# Patient Record
Sex: MALE | Race: WHITE
[De-identification: names, ages, dates, MRNs, and addresses within clinical notes are randomized per-mention and may not be internally consistent; named-entity substitution may affect disease eponyms.]

---

## 2023-05-30 ENCOUNTER — HOSPITAL ENCOUNTER (EMERGENCY)
Dept: HOSPITAL 75 - ER FS | Age: 18
Discharge: HOME | End: 2023-05-30
Payer: SELF-PAY

## 2023-05-30 VITALS — DIASTOLIC BLOOD PRESSURE: 62 MMHG | SYSTOLIC BLOOD PRESSURE: 105 MMHG

## 2023-05-30 DIAGNOSIS — Z28.310: ICD-10-CM

## 2023-05-30 DIAGNOSIS — N20.0: Primary | ICD-10-CM

## 2023-05-30 LAB
ALBUMIN SERPL-MCNC: 4.8 GM/DL (ref 3.2–4.5)
ALP SERPL-CCNC: 106 U/L (ref 40–136)
ALT SERPL-CCNC: 14 U/L (ref 0–55)
AMORPH SED URNS QL MICRO: (no result) /LPF
APTT PPP: YELLOW S
BACTERIA #/AREA URNS HPF: NEGATIVE /HPF
BASOPHILS # BLD AUTO: 0.1 10^3/UL (ref 0–0.1)
BASOPHILS NFR BLD AUTO: 1 % (ref 0–10)
BILIRUB SERPL-MCNC: 0.6 MG/DL (ref 0.1–1)
BILIRUB UR QL STRIP: NEGATIVE
BUN/CREAT SERPL: 19
CALCIUM SERPL-MCNC: 9.3 MG/DL (ref 8.5–10.1)
CHLORIDE SERPL-SCNC: 103 MMOL/L (ref 98–107)
CO2 SERPL-SCNC: 27 MMOL/L (ref 21–32)
CREAT SERPL-MCNC: 0.91 MG/DL (ref 0.6–1.3)
EOSINOPHIL # BLD AUTO: 0.2 10^3/UL (ref 0–0.3)
EOSINOPHIL NFR BLD AUTO: 4 % (ref 0–10)
FIBRINOGEN PPP-MCNC: (no result) MG/DL
GFR SERPLBLD BASED ON 1.73 SQ M-ARVRAT: 125 ML/MIN
GLUCOSE SERPL-MCNC: 88 MG/DL (ref 70–105)
GLUCOSE UR STRIP-MCNC: NEGATIVE MG/DL
HCT VFR BLD CALC: 45 % (ref 40–54)
HGB BLD-MCNC: 15.6 G/DL (ref 13.3–17.7)
KETONES UR QL STRIP: NEGATIVE
LEUKOCYTE ESTERASE UR QL STRIP: NEGATIVE
LIPASE SERPL-CCNC: 16 U/L (ref 8–78)
LYMPHOCYTES # BLD AUTO: 1.8 10^3/UL (ref 1–4)
LYMPHOCYTES NFR BLD AUTO: 28 % (ref 12–44)
MANUAL DIFFERENTIAL PERFORMED BLD QL: NO
MCH RBC QN AUTO: 30 PG (ref 25–34)
MCHC RBC AUTO-ENTMCNC: 35 G/DL (ref 32–36)
MCV RBC AUTO: 87 FL (ref 80–99)
MONOCYTES # BLD AUTO: 0.5 10^3/UL (ref 0–1)
MONOCYTES NFR BLD AUTO: 9 % (ref 0–12)
NEUTROPHILS # BLD AUTO: 3.6 10^3/UL (ref 1.8–7.8)
NEUTROPHILS NFR BLD AUTO: 58 % (ref 42–75)
NITRITE UR QL STRIP: NEGATIVE
PH UR STRIP: 7 [PH] (ref 5–9)
PLATELET # BLD: 167 10^3/UL (ref 130–400)
PMV BLD AUTO: 8.7 FL (ref 9–12.2)
POTASSIUM SERPL-SCNC: 4.3 MMOL/L (ref 3.6–5)
PROT SERPL-MCNC: 7.2 GM/DL (ref 6.4–8.2)
PROT UR QL STRIP: NEGATIVE
RBC #/AREA URNS HPF: (no result) /HPF
SODIUM SERPL-SCNC: 140 MMOL/L (ref 135–145)
SP GR UR STRIP: 1.02 (ref 1.02–1.02)
SQUAMOUS #/AREA URNS HPF: (no result) /HPF
WBC # BLD AUTO: 6.2 10^3/UL (ref 4.3–11)
WBC #/AREA URNS HPF: (no result) /HPF

## 2023-05-30 PROCEDURE — 36415 COLL VENOUS BLD VENIPUNCTURE: CPT

## 2023-05-30 PROCEDURE — 81000 URINALYSIS NONAUTO W/SCOPE: CPT

## 2023-05-30 PROCEDURE — 85025 COMPLETE CBC W/AUTO DIFF WBC: CPT

## 2023-05-30 PROCEDURE — 74176 CT ABD & PELVIS W/O CONTRAST: CPT

## 2023-05-30 PROCEDURE — 80053 COMPREHEN METABOLIC PANEL: CPT

## 2023-05-30 PROCEDURE — 83690 ASSAY OF LIPASE: CPT

## 2023-05-30 NOTE — ED ABDOMINAL PAIN
General


Chief Complaint:  Abdominal/GI Problems


Stated Complaint:  LLQ PAIN; VOMITING


Source of Information:  Patient, RN/MD (APRN April called from [facility] 

clinic to say she was sending the patient with his Left flank pain)





History of Present Illness


Date Seen by Provider:  May 30, 2023


Time Seen by Provider:  13:53


Initial Comments


17-year-old male presenting with left-sided flank pain off and on for the last 3

months.  He felt like it has been worse in the last several days.  He denies 

having fever but felt like he had some chills on Saturday.  He reports some 

burning pain with urination.  He denies any blood in his urine or blood in his 

stools.  He had a normal bowel movement this morning and did not affect his 

pain.  He has not seen any diarrhea or blood in his stool.  He has not taken 

anything for the pain today.  He states his pain currently is a 1 or 2 but when 

pushing on his belly it goes up to a 5 or 6.  He denies having pain or symptoms 

like this prior to the last 3 months.  He does have vomiting in the evening but 

is able to tolerate oral intake during the day.


Severity/Quality:  Mild, Sharp


Location:  Flank (Left flank)


Radiation:  Back


Activities at Onset:  None


Modifying Factors:  Worsens With Movement, Worsens With Palpation


Associated Symptoms:  No Chest Pain, No Diaphoresis, No Fever/Chills, No 

Fatigue, No Headache, No Heartburn, No Nausea/Vomiting, No Shortness of Air, No 

Swelling/Mass in Abdomen, No Syncope, No Weakness





Allergies and Home Medications


Allergies


Coded Allergies:  


     No Known Drug Allergies (Unverified , 23)





Patient Home Medication List


Home Medication List Reviewed:  Yes


Naproxen (Naprosyn) 500 Mg Tablet, 500 MG PO BID PRN for flank pain


   Prescribed by: KENJI OLSEN on 23 1549


Ondansetron (Ondansetron Odt) 4 Mg Tab.rapdis, 4 MG PO Q6H PRN for 

NAUSEA/VOMITING


   Prescribed by: KENJI ZAZUETART on 23 1549





Review of Systems


Review of Systems


Constitutional:  chills (over the weekend); No fever


EENTM:  No Symptoms Reported


Respiratory:  No Symptoms Reported


Cardiovascular:  No Symptoms Reported


Gastrointestinal:  See HPI


Genitourinary:  See HPI


Musculoskeletal:  no symptoms reported


Skin:  No rash


Psychiatric/Neurological:  No Symptoms Reported





Past Medical-Social-Family Hx


Patient Social History


Tobacco Use?:  No


Use of E-Cig and/or Vaping dev:  No


Substance use?:  No


Alcohol Use?:  No





Past Medical History


Surgeries:  No





Physical Exam


Vital Signs





Vital Signs - First Documented








 23





 13:55


 


Temp 36.8


 


Pulse 87


 


Resp 16


 


B/P (MAP) 99/58 (72)


 


Pulse Ox 98


 


O2 Delivery Room Air





Capillary Refill :


Height/Weight/BMI


Height: '"


Weight: lbs. oz. kg;  BMI


Method:


General Appearance:  WD/WN, no apparent distress


HEENT:  PERRL/EOMI, pharynx normal


Neck:  non-tender, full range of motion, supple, normal inspection


Respiratory:  chest non-tender, lungs clear, normal breath sounds, no 

respiratory distress, no accessory muscle use


Cardiovascular:  normal peripheral pulses, regular rate, rhythm


Gastrointestinal:  normal bowel sounds, soft, no pulsatile mass; No distended, 

No guarding, No rebound; tenderness (pain to palpation of the left anterior 

abdomen/flank)


Rectal:  deferred


Extremities:  normal range of motion, non-tender, normal capillary refill


Neurologic/Psychiatric:  alert, oriented x 3


Skin:  normal color, warm/dry





Progress/Results/Core Measures


Results/Orders


Lab Results





Laboratory Tests








Test


 23


13:55 23


14:00 Range/Units


 


 


Urine Color YELLOW    


 


Urine Clarity CLOUDY    


 


Urine pH 7.0   5-9  


 


Urine Specific Gravity 1.020   1.016-1.022  


 


Urine Protein NEGATIVE   NEGATIVE  


 


Urine Glucose (UA) NEGATIVE   NEGATIVE  


 


Urine Ketones NEGATIVE   NEGATIVE  


 


Urine Nitrite NEGATIVE   NEGATIVE  


 


Urine Bilirubin NEGATIVE   NEGATIVE  


 


Urine Urobilinogen 0.2   < = 1.0  MG/DL


 


Urine Leukocyte Esterase NEGATIVE   NEGATIVE  


 


Urine RBC (Auto) NEGATIVE   NEGATIVE  


 


Urine RBC RARE    /HPF


 


Urine WBC NONE    /HPF


 


Urine Squamous Epithelial


Cells NONE 


 


  /HPF





 


Urine Crystals PRESENT H   /LPF


 


Urine Amorphous Sediment


 MOD TUNG


URATES H 


  /LPF





 


Urine Bacteria NEGATIVE    /HPF


 


Urine Casts NONE    /LPF


 


Urine Mucus LARGE H   /LPF


 


Urine Culture Indicated NO    


 


White Blood Count


 


 6.2 


 4.3-11.0


10^3/uL


 


Red Blood Count


 


 5.18 


 4.30-5.52


10^6/uL


 


Hemoglobin  15.6  13.3-17.7  g/dL


 


Hematocrit  45  40-54  %


 


Mean Corpuscular Volume  87  80-99  fL


 


Mean Corpuscular Hemoglobin  30  25-34  pg


 


Mean Corpuscular Hemoglobin


Concent 


 35 


 32-36  g/dL





 


Red Cell Distribution Width  12.0  10.0-14.5  %


 


Platelet Count


 


 167 


 130-400


10^3/uL


 


Mean Platelet Volume  8.7 L 9.0-12.2  fL


 


Immature Granulocyte % (Auto)  0   %


 


Neutrophils (%) (Auto)  58  42-75  %


 


Lymphocytes (%) (Auto)  28  12-44  %


 


Monocytes (%) (Auto)  9  0-12  %


 


Eosinophils (%) (Auto)  4  0-10  %


 


Basophils (%) (Auto)  1  0-10  %


 


Neutrophils # (Auto)


 


 3.6 


 1.8-7.8


10^3/uL


 


Lymphocytes # (Auto)


 


 1.8 


 1.0-4.0


10^3/uL


 


Monocytes # (Auto)


 


 0.5 


 0.0-1.0


10^3/uL


 


Eosinophils # (Auto)


 


 0.2 


 0.0-0.3


10^3/uL


 


Basophils # (Auto)


 


 0.1 


 0.0-0.1


10^3/uL


 


Immature Granulocyte # (Auto)


 


 0.0 


 0.0-0.1


10^3/uL


 


Sodium Level  140  135-145  MMOL/L


 


Potassium Level  4.3  3.6-5.0  MMOL/L


 


Chloride Level  103    MMOL/L


 


Carbon Dioxide Level  27  21-32  MMOL/L


 


Anion Gap  10  5-14  MMOL/L


 


Blood Urea Nitrogen  17  7-18  MG/DL


 


Creatinine


 


 0.91 


 0.60-1.30


MG/DL


 


Estimat Glomerular Filtration


Rate 


 125 


  





 


BUN/Creatinine Ratio  19   


 


Glucose Level  88    MG/DL


 


Calcium Level  9.3  8.5-10.1  MG/DL


 


Corrected Calcium    8.5-10.1  MG/DL


 


Total Bilirubin  0.6  0.1-1.0  MG/DL


 


Aspartate Amino Transf


(AST/SGOT) 


 21 


 5-34  U/L





 


Alanine Aminotransferase


(ALT/SGPT) 


 14 


 0-55  U/L





 


Alkaline Phosphatase  106    U/L


 


Total Protein  7.2  6.4-8.2  GM/DL


 


Albumin  4.8 H 3.2-4.5  GM/DL


 


Lipase  16  8-78  U/L








My Orders





Orders - KENJI OLSEN MD


Comprehensive Metabolic Panel (23 14:00)


Lipase (23 14:00)


Ua Culture If Indicated (23 14:00)


Ed Iv/Invasive Line Start (23 14:00)


Cbc With Automated Diff (23 14:00)


Ct Abdomen/Pelvis Wo (23 14:00)


Ns Iv 1000 Ml (Sodium Chloride 0.9%) (23 14:00)


Ketorolac Injection (Toradol Injection) (23 14:00)





Vital Signs/I&O











 23





 13:55 15:51


 


Temp 36.8 36.8


 


Pulse 87 78


 


Resp 16 16


 


B/P (MAP) 99/58 (72) 105/62


 


Pulse Ox 98 100


 


O2 Delivery Room Air Room Air











Progress


Progress Note #1:  


Progress Note


Potential diagnosis of gastritis, pancreatitis, pyelonephritis, renal colic, 

diverticulitis, colitis.





Obtain peripheral IV access and send labs for complete blood count, 

comprehensive metabolic profile, lipase, urinalysis.  CT scan of the abdomen and

pelvis without contrast to look for signs of kidney stones and other pathology 

to account for his 3 months of left flank pain.  He had normal saline 1 L IV 

fluid bolus for hydration, Toradol 15 mg IV for pain and inflammation.


Progress Note #2:  


Progress Note


Complete blood count does not show an elevated white blood cell count to 

indicate signs of infection.  His comprehensive metabolic profile was stable 

without acute significant abnormality to account for his recurrent left flank 

pain.  His urinalysis had a specific gravity of 1.020 and did not demonstrate 

any nitrites or leukocyte esterase.  On my personal review of his CT scan of the

abdomen pelvis without IV contrast he did not appear to have any ureteral kidney

stones or signs of intra-abdominal pathology.





The radiologist read the CT scan as having possible kidney stone in the 

medullary aspect of the left kidney.  There is no definite ureteral obstruction 

or blockage.  We will have patient use anti-inflammatory for pain and make sure 

he is drinking plenty of fluids.  Prescribed a few Zofran ODT so he has so

mething available to help keep his stomach settled so he can eat and drink 

better.  Encouraged to avoid caffeinated drinks and alcohol.  He does drink a 

lot of coffee in a day and very little water.  Counseled that if his symptoms 

persisted and were not improving with treatment then he needed to check back 

with the clinic as they may need to refer him to a urologist or have additional 

testing beyond what is available in the ER





Diagnostic Imaging





   Diagonstic Imaging:  CT


   Plain Films/CT/US/NM/MRI:  abdomen, pelvis


Comments


                 [facility].


                                [place], [place]





NAME:   KYAW HERNNADEZ


MED REC#:   [id number]


ACCOUNT#:   [id number]


PT STATUS:   REG ER


:   2005


PHYSICIAN:   KENJI OLSEN MD


ADMIT DATE:   23/ER FS


                                   ***Draft***


Date of Exam:23





CT ABDOMEN/PELVIS WO








EXAMINATION: CT abdomen and pelvis without contrast.





TECHNIQUE: Multiple contiguous axial images were obtained through


the abdomen and pelvis without the use of intravenous contrast.


All CT scans use one or more of the following dose optimizing


techniques: automated exposure control, MA and/or KvP adjustment


based on patient size and exam type or iterative reconstruction. 





HISTORY: 


Left flank pain.





COMPARISON: 


None available.





FINDINGS: 


Limited views of the lower thorax are unremarkable.





The liver is normal without focal lesion. There is no biliary


ductal dilation. Gallbladder is normal.  Pancreas is normal. 


Spleen is normal. Adrenal glands are normal.





Vague areas of high attenuation in the renal medulla may


represent medullary nephrocalcinosis. No renal or ureteral stones


are seen. There is no hydronephrosis. Urinary bladder is normal.





Bowel is normal in caliber without obstruction or inflammation.


No free fluid or air. No abdominal or pelvic lymphadenopathy.


Aorta is normal in caliber without aneurysm.





There are no suspicious osseus lesions.





IMPRESSION:


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 No renal or ureteral stones.





  Dictated on workstation # [id number]








Dict:   23 1423


Trans:   23 1432


 [id number]





Interpreted by:     MILLER CALDERON MD


Electronically signed by:


   Reviewed:  Reviewed by Me





Departure


Impression





   Primary Impression:  


   Left flank pain


   Additional Impression:  


   Kidney stone on left side


Disposition:  01 HOME, SELF-CARE


Condition:  Stable





Departure-Patient Inst.


Decision time for Depature:  15:38


Referrals:  


MARLEY MACK (PCP)


Primary Care Physician








[facility]/RYAN (Family)


Primary Care Physician


Patient Instructions:  Flank Pain ED, Kidney Stone, Adult ED, Abdominal Pain, 

Adult ED





Add. Discharge Instructions:  


Stay well hydrated and drink plenty of water and electrolyte drinks.





Take the anti-inflammatory medicine to try and help with the pain. 





Use the dissolving nausea tablet to help keep your stomach settled.





If you have continued symptoms then check with the clinic as they may need to 

get you set up with a Urologist to be seen as a specialist for kidney stones. 





All discharge instructions reviewed with patient and/or family. Voiced 

understanding.


Scripts


Naproxen (Naprosyn) 500 Mg Tablet


500 MG PO BID PRN for flank pain for 10 Days, #20 TAB 0 Refills


   Prov: KENJI OLSEN MD         23 


Ondansetron (Ondansetron Odt) 4 Mg Tab.rapdis


4 MG PO Q6H PRN for NAUSEA/VOMITING for 3 Days, #12 TAB 0 Refills


   Prov: KENJI OLSEN MD         23











KENJI OLSEN MD               May 30, 2023 14:07

## 2023-05-30 NOTE — DIAGNOSTIC IMAGING REPORT
EXAMINATION: CT abdomen and pelvis without contrast.



TECHNIQUE: Multiple contiguous axial images were obtained through

the abdomen and pelvis without the use of intravenous contrast.

All CT scans use one or more of the following dose optimizing

techniques: automated exposure control, MA and/or KvP adjustment

based on patient size and exam type or iterative reconstruction. 



HISTORY: 

Left flank pain.



COMPARISON: 

None available.



FINDINGS: 

Limited views of the lower thorax are unremarkable.



The liver is normal without focal lesion. There is no biliary

ductal dilation. Gallbladder is normal.  Pancreas is normal. 

Spleen is normal. Adrenal glands are normal.



Vague areas of high attenuation in the renal medulla may

represent medullary nephrocalcinosis. No renal or ureteral stones

are seen. There is no hydronephrosis. Urinary bladder is normal.



Bowel is normal in caliber without obstruction or inflammation.

No free fluid or air. No abdominal or pelvic lymphadenopathy.

Aorta is normal in caliber without aneurysm.



There are no suspicious osseus lesions.



IMPRESSION:

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 No renal or ureteral stones.



Dictated by: 



  Dictated on workstation # [id number]